# Patient Record
Sex: MALE | Race: OTHER | HISPANIC OR LATINO | URBAN - METROPOLITAN AREA
[De-identification: names, ages, dates, MRNs, and addresses within clinical notes are randomized per-mention and may not be internally consistent; named-entity substitution may affect disease eponyms.]

---

## 2024-02-08 ENCOUNTER — EMERGENCY (EMERGENCY)
Facility: HOSPITAL | Age: 19
LOS: 1 days | Discharge: HOME | End: 2024-02-08
Attending: EMERGENCY MEDICINE | Admitting: EMERGENCY MEDICINE
Payer: MEDICAID

## 2024-02-08 VITALS
DIASTOLIC BLOOD PRESSURE: 80 MMHG | OXYGEN SATURATION: 99 % | HEART RATE: 92 BPM | TEMPERATURE: 98 F | SYSTOLIC BLOOD PRESSURE: 119 MMHG | WEIGHT: 199.96 LBS | RESPIRATION RATE: 18 BRPM

## 2024-02-08 VITALS
SYSTOLIC BLOOD PRESSURE: 111 MMHG | HEART RATE: 64 BPM | RESPIRATION RATE: 18 BRPM | DIASTOLIC BLOOD PRESSURE: 69 MMHG | TEMPERATURE: 98 F | OXYGEN SATURATION: 97 %

## 2024-02-08 PROCEDURE — 73120 X-RAY EXAM OF HAND: CPT | Mod: 26,RT,59

## 2024-02-08 PROCEDURE — 73130 X-RAY EXAM OF HAND: CPT

## 2024-02-08 PROCEDURE — 96365 THER/PROPH/DIAG IV INF INIT: CPT

## 2024-02-08 PROCEDURE — 73130 X-RAY EXAM OF HAND: CPT | Mod: 26,RT

## 2024-02-08 PROCEDURE — 96375 TX/PRO/DX INJ NEW DRUG ADDON: CPT

## 2024-02-08 PROCEDURE — 99284 EMERGENCY DEPT VISIT MOD MDM: CPT | Mod: 25

## 2024-02-08 PROCEDURE — 90715 TDAP VACCINE 7 YRS/> IM: CPT

## 2024-02-08 PROCEDURE — 99285 EMERGENCY DEPT VISIT HI MDM: CPT

## 2024-02-08 PROCEDURE — 73120 X-RAY EXAM OF HAND: CPT

## 2024-02-08 PROCEDURE — 90471 IMMUNIZATION ADMIN: CPT

## 2024-02-08 RX ORDER — CEFAZOLIN SODIUM 1 G
1000 VIAL (EA) INJECTION EVERY 8 HOURS
Refills: 0 | Status: DISCONTINUED | OUTPATIENT
Start: 2024-02-08 | End: 2024-02-11

## 2024-02-08 RX ORDER — LIDOCAINE HCL 20 MG/ML
10 VIAL (ML) INJECTION ONCE
Refills: 0 | Status: COMPLETED | OUTPATIENT
Start: 2024-02-08 | End: 2024-02-08

## 2024-02-08 RX ORDER — OXYCODONE HYDROCHLORIDE 5 MG/1
1 TABLET ORAL
Qty: 8 | Refills: 0
Start: 2024-02-08 | End: 2024-02-09

## 2024-02-08 RX ORDER — TETANUS TOXOID, REDUCED DIPHTHERIA TOXOID AND ACELLULAR PERTUSSIS VACCINE, ADSORBED 5; 2.5; 8; 8; 2.5 [IU]/.5ML; [IU]/.5ML; UG/.5ML; UG/.5ML; UG/.5ML
0.5 SUSPENSION INTRAMUSCULAR ONCE
Refills: 0 | Status: COMPLETED | OUTPATIENT
Start: 2024-02-08 | End: 2024-02-08

## 2024-02-08 RX ORDER — ONDANSETRON 8 MG/1
4 TABLET, FILM COATED ORAL ONCE
Refills: 0 | Status: COMPLETED | OUTPATIENT
Start: 2024-02-08 | End: 2024-02-08

## 2024-02-08 RX ORDER — MORPHINE SULFATE 50 MG/1
4 CAPSULE, EXTENDED RELEASE ORAL ONCE
Refills: 0 | Status: DISCONTINUED | OUTPATIENT
Start: 2024-02-08 | End: 2024-02-08

## 2024-02-08 RX ADMIN — Medication 1000 MILLIGRAM(S): at 02:10

## 2024-02-08 RX ADMIN — MORPHINE SULFATE 4 MILLIGRAM(S): 50 CAPSULE, EXTENDED RELEASE ORAL at 01:34

## 2024-02-08 RX ADMIN — MORPHINE SULFATE 4 MILLIGRAM(S): 50 CAPSULE, EXTENDED RELEASE ORAL at 02:10

## 2024-02-08 RX ADMIN — Medication 10 MILLILITER(S): at 02:56

## 2024-02-08 RX ADMIN — Medication 100 MILLIGRAM(S): at 01:38

## 2024-02-08 RX ADMIN — TETANUS TOXOID, REDUCED DIPHTHERIA TOXOID AND ACELLULAR PERTUSSIS VACCINE, ADSORBED 0.5 MILLILITER(S): 5; 2.5; 8; 8; 2.5 SUSPENSION INTRAMUSCULAR at 01:34

## 2024-02-08 NOTE — ED ADULT NURSE REASSESSMENT NOTE - NS ED NURSE REASSESS COMMENT FT1
Pt received from night RN. Pt A&Ox4 on room air. Pt right hand wrapped with gauze. Pt states his pain is 5/10 at this time. Side table set up per MD request. Pt resting in stretcher.

## 2024-02-08 NOTE — ED PROVIDER NOTE - MUSCULOSKELETAL MINIMAL EXAM
R hand: deformity noted to 5th digit, laceration noted to proximal volar phalanx. Bone palpated through the laceration. sensation intact.

## 2024-02-08 NOTE — ED PROVIDER NOTE - CLINICAL SUMMARY MEDICAL DECISION MAKING FREE TEXT BOX
17 yo M with likely an open fracture/dislocation of right 5ht digit. Will give Abx, pain management, update Td, and irrigate laceration. Will get XRs. Will perform digital block and attempt to reduce finger.

## 2024-02-08 NOTE — ED PROVIDER NOTE - OBJECTIVE STATEMENT
17 yo M with no medical hx presents c/o R hand pain s/p injury while playing soccer. Soccer ball hit his 5th digit causing it to bend and break. Unsure of last Td.

## 2024-02-08 NOTE — ED PROVIDER NOTE - PATIENT PORTAL LINK FT
You can access the FollowMyHealth Patient Portal offered by [facility] by registering at the following website: [email or web address]. By joining [facility]’s FollowMyHealth portal, you will also be able to view your health information using other applications (apps) compatible with our system.

## 2024-02-08 NOTE — ED PROVIDER NOTE - NSFOLLOWUPINSTRUCTIONS_ED_ALL_ED_FT
Follow-up with hand specialist tomorrow, call today to make an appointment.  Take Duricef antibiotic as directed.  Take oxycodone pain medication as directed for pain, do not drive while taking this medication.  Keep splint dry.  Do not use your right hand.  Do not remove the splint.  Use sling during the day and may remove when taking a shower and or sleeping.

## 2024-02-08 NOTE — ED ADULT NURSE NOTE - OBJECTIVE STATEMENT
Pt presented to ED c/o pain to right 5th digit after being hit with soccer ball.  + deformity noted to right 5th digit.  + laceration with minimal bleeding noted.  Pt denies any other pain or discomfort.  Family at bedside.  Maintain comfort and safety.

## 2024-02-08 NOTE — ED PROVIDER NOTE - CARE PROVIDER_API CALL
Garth Mistry.  Plastic Surgery  [address] [place] [id number]  Phone: [phone]  Fax: [phone]  Follow Up Time:

## 2024-02-08 NOTE — ED PROVIDER NOTE - PROGRESS NOTE DETAILS
Digital block applied  Attempted to reduce dislocation, multiple attempts made, however unsuccessful  Will consult Hand for open fracture of finger  Pt has received Abx and td. Case d/w Dr. Mistry who will see pt in ED seen and treated by attending Dr. Mistry, to [place] home with duricef abx and follow up in office tomorrow

## 2024-12-28 NOTE — ED ADULT NURSE NOTE - CHPI ED NUR SYMPTOMS NEG
Interval History: NAEON. Patient is alert. VSS. Pending dispo.       Objective:     Vital Signs (Most Recent):  Temp: 98.4 °F (36.9 °C) (12/28/24 0715)  Pulse: 94 (12/28/24 0715)  Resp: 18 (12/28/24 0715)  BP: 107/73 (12/28/24 0715)  SpO2: 99 % (12/28/24 0715) Vital Signs (24h Range):  Temp:  [98.4 °F (36.9 °C)] 98.4 °F (36.9 °C)  Pulse:  [70-94] 94  Resp:  [18] 18  SpO2:  [97 %-99 %] 99 %  BP: (107-124)/(73-80) 107/73     Weight: 49.9 kg (110 lb 0.2 oz)  Body mass index is 20.12 kg/m².    Intake/Output Summary (Last 24 hours) at 12/28/2024 1054  Last data filed at 12/28/2024 0904  Gross per 24 hour   Intake 720 ml   Output --   Net 720 ml         Physical Exam  Vitals and nursing note reviewed.   Constitutional:       General: She is awake. She is not in acute distress.     Appearance: Normal appearance. She is not ill-appearing.   HENT:      Head: Normocephalic and atraumatic.      Nose: Nose normal.      Mouth/Throat:      Mouth: Mucous membranes are moist.      Pharynx: Oropharynx is clear. No oropharyngeal exudate.   Eyes:      Extraocular Movements: Extraocular movements intact.      Conjunctiva/sclera: Conjunctivae normal.   Cardiovascular:      Pulses: Normal pulses.   Pulmonary:      Effort: Pulmonary effort is normal. No respiratory distress.   Abdominal:      General: Abdomen is flat.      Palpations: Abdomen is soft.   Musculoskeletal:      Right lower leg: No edema.      Left lower leg: No edema.   Neurological:      General: No focal deficit present.      Mental Status: She is alert. Mental status is at baseline. She is disoriented.   Psychiatric:         Mood and Affect: Mood normal.         Behavior: Behavior is cooperative.         Cognition and Memory: Memory is impaired.             Significant Labs: All pertinent labs within the past 24 hours have been reviewed.    Significant Imaging: I have reviewed all pertinent imaging results/findings within the past 24 hours.   no back pain/no bruising/no difficulty bearing weight/no fever/no numbness/no stiffness/no tingling/no weakness